# Patient Record
Sex: MALE | Race: OTHER | HISPANIC OR LATINO | URBAN - METROPOLITAN AREA
[De-identification: names, ages, dates, MRNs, and addresses within clinical notes are randomized per-mention and may not be internally consistent; named-entity substitution may affect disease eponyms.]

---

## 2017-07-07 ENCOUNTER — EMERGENCY (EMERGENCY)
Facility: HOSPITAL | Age: 22
LOS: 1 days | Discharge: HOME | End: 2017-07-07
Attending: EMERGENCY MEDICINE | Admitting: EMERGENCY MEDICINE
Payer: COMMERCIAL

## 2017-07-07 VITALS
RESPIRATION RATE: 18 BRPM | DIASTOLIC BLOOD PRESSURE: 80 MMHG | SYSTOLIC BLOOD PRESSURE: 149 MMHG | OXYGEN SATURATION: 99 % | HEART RATE: 70 BPM | TEMPERATURE: 98 F

## 2017-07-07 PROCEDURE — 99284 EMERGENCY DEPT VISIT MOD MDM: CPT

## 2017-07-07 PROCEDURE — 70480 CT ORBIT/EAR/FOSSA W/O DYE: CPT | Mod: 26

## 2017-07-07 RX ORDER — TETANUS AND DIPHTHERIA TOXOIDS ADSORBED 2; 2 [LF]/.5ML; [LF]/.5ML
0.5 INJECTION INTRAMUSCULAR ONCE
Qty: 0 | Refills: 0 | Status: DISCONTINUED | OUTPATIENT
Start: 2017-07-07 | End: 2017-07-07

## 2017-07-07 RX ORDER — TETANUS TOXOID, REDUCED DIPHTHERIA TOXOID AND ACELLULAR PERTUSSIS VACCINE, ADSORBED 5; 2.5; 8; 8; 2.5 [IU]/.5ML; [IU]/.5ML; UG/.5ML; UG/.5ML; UG/.5ML
0.5 SUSPENSION INTRAMUSCULAR ONCE
Qty: 0 | Refills: 0 | Status: COMPLETED | OUTPATIENT
Start: 2017-07-07 | End: 2017-07-07

## 2017-07-07 RX ADMIN — TETANUS TOXOID, REDUCED DIPHTHERIA TOXOID AND ACELLULAR PERTUSSIS VACCINE, ADSORBED 0.5 MILLILITER(S): 5; 2.5; 8; 8; 2.5 SUSPENSION INTRAMUSCULAR at 22:25

## 2017-07-07 NOTE — ED PROVIDER NOTE - PROGRESS NOTE DETAILS
Ivette att: Patient signed out to me 1am, dispo pending ct orbit r/o foreign body. CT neg foreign body. Dr. Cerna gave patient erythromycin ophthalmic ointment. DC instructions below reviewed with patient.

## 2017-07-07 NOTE — ED ADULT NURSE NOTE - OBJECTIVE STATEMENT
pt c.o. something possibly getting into rt eye today at work. c.o. tearing and blurry vision . awaiting ct. to rw pt c.o. something possibly getting into lt eye today at work. c.o. tearing and blurry vision . awaiting ct. to rw

## 2017-07-07 NOTE — ED ADULT NURSE NOTE - CHIEF COMPLAINT
The patient is a 22y Male complaining of possible rt eye foreign body The patient is a 22y Male complaining of possible lt eye foreign body

## 2017-07-07 NOTE — ED PROVIDER NOTE - HISTORY ATTESTATION, MLM
Patient up ambulating, patient dyspneic on ambulation.   I have reviewed and confirmed nurses' notes...

## 2017-07-07 NOTE — ED PROVIDER NOTE - MEDICAL DECISION MAKING DETAILS
21y/o M presents to the ED c/o blurred vision, eye pain and eye itching with a rust ring to the center of his L pupil. Plan: CT of the orbit, tetanus shot. If no foreign body visualized, will DC home with optho f/u and abx ointment to the eye. 21y/o M presents to the ED c/o blurred vision, eye pain and eye itching with a rust ring to the center of his L pupil. Plan: CT of the orbit, tetanus shot. If no foreign body visualized, will DC home with ophtho f/u and abx erythromycin ointment to the left eye Q6H (gave pt supply in ed).

## 2017-07-07 NOTE — ED PROVIDER NOTE - PLAN OF CARE
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 Seen in ED for foreign body in eye. Please apply erythromycin ointment 4 times a day first day, twice a day x days 2-7. Take Motrin 600 mg every 6 hours or Tylenol 650mg every 4 hours as needed for pain. Call [phone] Monday morning to make an appointment with Ophthamology (eye doctor). Please return to ED for any new or worsening symptoms.

## 2017-07-07 NOTE — ED PROVIDER NOTE - OBJECTIVE STATEMENT
21y/o M with no pertinent PMHx presents to the ED c/o itchiness to his L eye, visual changes, and the feeling of a foreign body in his eye x12h. Pt was cutting metal at work, and felt something go into his L eye while wearing protective goggles. Pt reports the obstruction feels as if it's in the middle of his eye. He reports some minor visual changes and blurring, but is not able to describe the changes. Pt does not remember his last date of tetanus shot. Denies any other complaints. NKDA.

## 2017-07-07 NOTE — ED PROVIDER NOTE - CARE PLAN
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 Seen in ED for foreign body in eye. Please apply erythromycin ointment 4 times a day first day, twice a day x days 2-7. Take Motrin 600 mg every 6 hours or Tylenol 650mg every 4 hours as needed for pain. Call [phone] Monday morning to make an appointment with Ophthamology (eye doctor). Please return to ED for any new or worsening symptoms.  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 Seen in ED for foreign body in eye. Please apply erythromycin ointment 4 times a day first day, twice a day x days 2-7. Take Motrin 600 mg every 6 hours or Tylenol 650mg every 4 hours as needed for pain. Call [phone] Monday morning to make an appointment with Ophthamology (eye doctor). Please return to ED for any new or worsening symptoms.  Secondary Diagnosis:	Corneal abrasion, left, initial encounter

## 2017-07-07 NOTE — ED ADULT TRIAGE NOTE - CHIEF COMPLAINT QUOTE
Pt states while working today something may have gotten in his left eye. Pt now c/o blurry vision, itching  and intermittent tears in left eye.

## 2018-10-27 ENCOUNTER — EMERGENCY (EMERGENCY)
Facility: HOSPITAL | Age: 23
LOS: 1 days | Discharge: HOME | End: 2018-10-27
Admitting: EMERGENCY MEDICINE
Payer: COMMERCIAL

## 2018-10-27 VITALS
DIASTOLIC BLOOD PRESSURE: 92 MMHG | SYSTOLIC BLOOD PRESSURE: 134 MMHG | TEMPERATURE: 98 F | HEART RATE: 62 BPM | RESPIRATION RATE: 16 BRPM | OXYGEN SATURATION: 97 %

## 2018-10-27 VITALS
RESPIRATION RATE: 16 BRPM | OXYGEN SATURATION: 99 % | TEMPERATURE: 98 F | SYSTOLIC BLOOD PRESSURE: 136 MMHG | DIASTOLIC BLOOD PRESSURE: 79 MMHG | HEART RATE: 73 BPM

## 2018-10-27 PROCEDURE — 99284 EMERGENCY DEPT VISIT MOD MDM: CPT

## 2018-10-27 PROCEDURE — 70480 CT ORBIT/EAR/FOSSA W/O DYE: CPT | Mod: 26

## 2018-10-27 RX ORDER — OFLOXACIN 0.3 %
2 DROPS OPHTHALMIC (EYE) ONCE
Qty: 0 | Refills: 0 | Status: COMPLETED | OUTPATIENT
Start: 2018-10-27 | End: 2018-10-27

## 2018-10-27 RX ADMIN — Medication 2 DROP(S): at 22:56

## 2018-10-27 NOTE — ED PROVIDER NOTE - CARE PLAN
Principal Discharge DX:	Corneal foreign body  Assessment and plan of treatment:	Advance activity as tolerated.  Continue all previously prescribed medications as directed unless otherwise instructed.  Apply Ocuflox two drops four times a day to affected eye for 1 week. Apply Erythromycin ointment once a day at bedtime.  Follow up with your primary care physician and ophthalmology clinic ([address] [place] [address], [address], [place], [place] ([phone]))  in 48-72 hours- bring copies of your results.  Return to the ER for worsening or persistent symptoms, and/or ANY NEW OR CONCERNING SYMPTOMS. If you have issues obtaining follow up, please call: [phone] to obtain a doctor or specialist who takes your insurance in your area.  You may call [phone] to make an appointment with the internal medicine clinic.

## 2018-10-27 NOTE — ED PROVIDER NOTE - PROGRESS NOTE DETAILS
BAR HUGHES:  Ct negative for foreign body.  Pt given ocuflox. Pt medically stable for discharge. Pt to follow up with ophthalmology on Monday 10/29/18.

## 2018-10-27 NOTE — ED PROVIDER NOTE - CHPI ED SYMPTOMS NEG
no drainage/no eye lid swelling/no blurred vision/no photophobia/no purulent drainage/no double vision/no discharge

## 2018-10-27 NOTE — ED ADULT NURSE REASSESSMENT NOTE - NS ED NURSE REASSESS COMMENT FT1
Pt sitting quielty calm smiling affect. vss. Pt sitting quielty calm smiling affect. vss. Pt reports " I my tetnus is up to date....had one last year."

## 2018-10-27 NOTE — ED PROVIDER NOTE - OBJECTIVE STATEMENT
Pt is a 22 y/o M nonsmoker no PMHx p/w left irritation and redness x 3 days.  Pt states three days ago, pt was cutting metal when he experienced left eye foreign body sensation and pain described as mild sticking pain.  Pt states pain similar to when he had foreign body to left eye in past.  Pt denies any fevers, chills, headache, vision loss, blurred vision, double vision.  Pt offers no other complaints.

## 2018-10-27 NOTE — ED PROVIDER NOTE - PLAN OF CARE
Advance activity as tolerated.  Continue all previously prescribed medications as directed unless otherwise instructed.  Apply Ocuflox two drops four times a day to affected eye for 1 week. Apply Erythromycin ointment once a day at bedtime.  Follow up with your primary care physician and ophthalmology clinic ([address] [place] [address], [address], [place], [place] [phone]))  in 48-72 hours- bring copies of your results.  Return to the ER for worsening or persistent symptoms, and/or ANY NEW OR CONCERNING SYMPTOMS. If you have issues obtaining follow up, please call: [phone] to obtain a doctor or specialist who takes your insurance in your area.  You may call [phone] to make an appointment with the internal medicine clinic.

## 2018-10-27 NOTE — ED ADULT TRIAGE NOTE - CHIEF COMPLAINT QUOTE
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

## 2018-10-29 ENCOUNTER — APPOINTMENT (OUTPATIENT)
Dept: OPHTHALMOLOGY | Facility: CLINIC | Age: 23
End: 2018-10-29

## 2019-02-06 PROBLEM — Z00.00 ENCOUNTER FOR PREVENTIVE HEALTH EXAMINATION: Status: ACTIVE | Noted: 2019-02-06

## 2021-06-21 NOTE — ED PROVIDER NOTE - RESPIRATORY, MLM
Caller: Campbell Alex    Relationship: Self    Best call back number: [phone]    What specialty or service is being requested: UROLOGY    Any additional details: PATIENT STATES HE WOULD LIKE TO GO AHEAD WITH THE UROLOGY REFERRAL.  PLEASE CALL AND ADVISE         Breath sounds clear and equal bilaterally.

## 2024-01-10 NOTE — ED PROVIDER NOTE - NS CPE EDP EYE EXAM BOTH DETAIL
Office Note: Establish Care  2024  Patient Name: Josue Henson  MRN: [id number] : 2000    SUBJECTIVE:     CHIEF COMPLAINT:  Chief Complaint   Patient presents with    [facility]     No concerns today.      HISTORY OF PRESENT ILLNESS:  Patient is a 23 y.o. male with a PMH of acne who presents today to [facility].    Preventive Care  - Last colon cancer screen: N/A  - Needed vaccines: covid  - Diet: eats pretty healthy  - Exercise: exercises a lot, plays basketball and soccer  - Dentist: up to date   - Eye Doctor: never seen  - Tobacco Use: N/A  Lung Cancer Screen? N/A   AAA Screen? N/A  - Depression screen: completed today     Past Medical History:  No past medical history on file.  Past Surgical History:  No past surgical history on file.  Medications:  No current outpatient medications on file.     No current facility-administered medications for this visit.     Allergies:  No Known Allergies  Social History:  Social History     Tobacco Use    Smoking status: Never    Smokeless tobacco: Never   Vaping Use    Vaping Use: Never used   Substance Use Topics    Alcohol use: Never    Drug use: Never        ROS and PHYSICAL:   ROS:  10 point ROS otherwise negative except as mentioned in the HPI    VITALS:  Vitals:    24 1015   BP: 116/72   Site: Left Upper Arm   Position: Sitting   Cuff Size: Medium Adult   Pulse: 60   Temp: 98.1 °F (36.7 °C)   TempSrc: Infrared   SpO2: 99%   Weight: 73.1 kg (161 lb 3.2 oz)   Height: 1.74 m (5' 8.5\")      Body mass index is 24.15 kg/m².    PHYSICAL EXAM:  GENERAL: NAD, alert and oriented  HEENT: Head: NC/AT. Eyes: clear conjunctiva. Ears: TMs normal bilaterally. Nose: normal. Throat: Oropharynx normal.    NECK: Supple, no LAD, no thyromegaly or thyroid nodules  RESPIRATORY: Normal respiratory effort, lungs CTAB no crackles, wheezes, or rhonchi  HEART: Regular rate and rhythm, no murmurs  ABDOMEN: Soft, nontender, nondistended  EXTREMITIES: No edema.  uncorrected